# Patient Record
Sex: MALE | Race: WHITE | NOT HISPANIC OR LATINO
[De-identification: names, ages, dates, MRNs, and addresses within clinical notes are randomized per-mention and may not be internally consistent; named-entity substitution may affect disease eponyms.]

---

## 2017-11-16 PROBLEM — Z00.00 ENCOUNTER FOR PREVENTIVE HEALTH EXAMINATION: Status: ACTIVE | Noted: 2017-11-16

## 2017-11-17 ENCOUNTER — APPOINTMENT (OUTPATIENT)
Dept: DERMATOLOGY | Facility: CLINIC | Age: 62
End: 2017-11-17
Payer: COMMERCIAL

## 2017-11-17 VITALS — WEIGHT: 170 LBS | BODY MASS INDEX: 25.76 KG/M2 | HEIGHT: 68 IN

## 2017-11-17 VITALS — HEIGHT: 68 IN | BODY MASS INDEX: 25.76 KG/M2 | WEIGHT: 170 LBS

## 2017-11-17 PROCEDURE — 99213 OFFICE O/P EST LOW 20 MIN: CPT | Mod: 25

## 2017-11-17 PROCEDURE — 17003 DESTRUCT PREMALG LES 2-14: CPT

## 2017-11-17 PROCEDURE — 17000 DESTRUCT PREMALG LESION: CPT

## 2017-11-17 RX ORDER — CLOBETASOL PROPIONATE 0.5 MG/G
0.05 CREAM TOPICAL
Qty: 30 | Refills: 0 | Status: DISCONTINUED | COMMUNITY
Start: 2017-08-01 | End: 2017-11-17

## 2017-11-17 RX ORDER — AZITHROMYCIN 250 MG/1
250 TABLET, FILM COATED ORAL
Qty: 6 | Refills: 0 | Status: DISCONTINUED | COMMUNITY
Start: 2017-08-24 | End: 2017-11-17

## 2018-11-16 ENCOUNTER — APPOINTMENT (OUTPATIENT)
Dept: DERMATOLOGY | Facility: CLINIC | Age: 63
End: 2018-11-16
Payer: COMMERCIAL

## 2018-11-16 DIAGNOSIS — D48.5 NEOPLASM OF UNCERTAIN BEHAVIOR OF SKIN: ICD-10-CM

## 2018-11-16 PROCEDURE — 11100 BX SKIN SUBCUTANEOUS&/MUCOUS MEMBRANE 1 LESION: CPT | Mod: 59

## 2018-11-16 PROCEDURE — 17000 DESTRUCT PREMALG LESION: CPT

## 2018-11-16 PROCEDURE — 99213 OFFICE O/P EST LOW 20 MIN: CPT | Mod: 25

## 2018-11-16 RX ORDER — NEOMYCIN AND POLYMYXIN B SULFATES AND HYDROCORTISONE OTIC 10; 3.5; 1 MG/ML; MG/ML; [USP'U]/ML
3.5-10000-1 SUSPENSION AURICULAR (OTIC)
Qty: 10 | Refills: 0 | Status: DISCONTINUED | COMMUNITY
Start: 2017-08-24 | End: 2018-11-16

## 2018-11-16 RX ORDER — ASPIRIN ENTERIC COATED TABLETS 81 MG 81 MG/1
81 TABLET, DELAYED RELEASE ORAL
Qty: 90 | Refills: 0 | Status: DISCONTINUED | COMMUNITY
Start: 2017-07-10 | End: 2018-11-16

## 2018-12-03 LAB — CORE LAB BIOPSY: NORMAL

## 2019-01-07 ENCOUNTER — APPOINTMENT (OUTPATIENT)
Dept: DERMATOLOGY | Facility: CLINIC | Age: 64
End: 2019-01-07
Payer: COMMERCIAL

## 2019-01-07 DIAGNOSIS — C44.91 BASAL CELL CARCINOMA OF SKIN, UNSPECIFIED: ICD-10-CM

## 2019-01-07 PROCEDURE — 99214 OFFICE O/P EST MOD 30 MIN: CPT

## 2019-01-08 PROBLEM — C44.91 BASAL CELL CARCINOMA: Status: ACTIVE | Noted: 2019-01-07

## 2019-01-08 NOTE — HISTORY OF PRESENT ILLNESS
[FreeTextEntry1] : Mohs Consult for BCC on R apical triangle of the cutaneous upper lip [de-identified] : Date of consult: 01/07/2019 \par Referring physician: MARIAJOSE KENT MD \par Tumor: n+i BCC\par Location: R apical triangle\par \par KARAN VERA is a 63 year year-old male here today for Mohs consultation. Mr. VERA was recently diagnosed with the above tumor(s) by his referring dermatologist. Reports the lesion was previously growing slowly, not painful, bled, partially healed, recurred and bled again. Mr. VERA has not had Mohs surgery previously.\par \par SH: , works in IT. Pleasant patient. No upcoming travel plans.\par \par HBV/HCV/HIV history: Denied\par Current Anticoagulant Use: ASA (stent)\par History of medical implant or pre-surgical antibiotic use: Denied

## 2019-01-08 NOTE — REVIEW OF SYSTEMS
[Patient Intake Form Reviewed] : Patient intake form was reviewed [Negative] : Genitourinary [de-identified] : see hpi

## 2019-01-08 NOTE — PHYSICAL EXAM
[Alert] : alert [Oriented x 3] : ~L oriented x 3 [Well Nourished] : well nourished [Conjunctiva Non-injected] : conjunctiva non-injected [No Visual Lymphadenopathy] : no visual  lymphadenopathy [No Clubbing] : no clubbing [No Edema] : no edema [No Bromhidrosis] : no bromhidrosis [No Chromhidrosis] : no chromhidrosis [FreeTextEntry3] : 0.8x0.9cm atrophic biopsy site of R apical triangle

## 2019-02-04 ENCOUNTER — APPOINTMENT (OUTPATIENT)
Dept: DERMATOLOGY | Facility: CLINIC | Age: 64
End: 2019-02-04
Payer: COMMERCIAL

## 2019-02-04 PROCEDURE — 17311 MOHS 1 STAGE H/N/HF/G: CPT

## 2019-02-04 PROCEDURE — 14061 TIS TRNFR E/N/E/L10.1-30SQCM: CPT

## 2019-02-11 ENCOUNTER — APPOINTMENT (OUTPATIENT)
Dept: DERMATOLOGY | Facility: CLINIC | Age: 64
End: 2019-02-11
Payer: COMMERCIAL

## 2019-02-11 PROCEDURE — 99024 POSTOP FOLLOW-UP VISIT: CPT

## 2019-04-15 ENCOUNTER — APPOINTMENT (OUTPATIENT)
Dept: DERMATOLOGY | Facility: CLINIC | Age: 64
End: 2019-04-15
Payer: COMMERCIAL

## 2019-04-15 PROCEDURE — 99024 POSTOP FOLLOW-UP VISIT: CPT

## 2019-05-24 ENCOUNTER — APPOINTMENT (OUTPATIENT)
Dept: DERMATOLOGY | Facility: CLINIC | Age: 64
End: 2019-05-24
Payer: COMMERCIAL

## 2019-05-24 PROCEDURE — 99213 OFFICE O/P EST LOW 20 MIN: CPT

## 2019-05-24 NOTE — HISTORY OF PRESENT ILLNESS
[de-identified] : Pt. presents for skin check;\par No itching, bleeding, growing, changing lesions noted; \par Recent infiltrative BCC near R nose; Mohs; 1/2019

## 2019-05-24 NOTE — ASSESSMENT
[FreeTextEntry1] : Complete skin examination is negative for malignancy; \par Recent BCC;  Follow up for TBSE in 6 months \par Continue regular exams; \par AKs on face clear;

## 2019-05-24 NOTE — PHYSICAL EXAM
[Full Body Skin Exam Performed] : performed [FreeTextEntry3] : Skin examination performed of the face, neck, trunk, arms, legs; \par The patient is well, alert and oriented, pleasant and cooperative.\par Eyelids, conjunctivae, oral mucosa, digits and nails all normal.  \par No cervical adenopathy.\par \par Normal findings include:\par \par Seborrheic keratoses- back, flanks\par Angiomas\par Lentigines\par healed scar;  R paranasal near ala\par \par No lesions were suspicious for malignancy. \par \par

## 2019-06-11 ENCOUNTER — APPOINTMENT (OUTPATIENT)
Dept: DERMATOLOGY | Facility: CLINIC | Age: 64
End: 2019-06-11
Payer: COMMERCIAL

## 2019-06-11 PROCEDURE — 99213 OFFICE O/P EST LOW 20 MIN: CPT

## 2019-11-27 ENCOUNTER — APPOINTMENT (OUTPATIENT)
Dept: DERMATOLOGY | Facility: CLINIC | Age: 64
End: 2019-11-27
Payer: COMMERCIAL

## 2019-11-27 PROCEDURE — 99213 OFFICE O/P EST LOW 20 MIN: CPT

## 2019-11-27 NOTE — HISTORY OF PRESENT ILLNESS
[de-identified] : Pt. presents for skin check;\par No itching, bleeding, growing, changing lesions noted; \par Recent infiltrative BCC near R nose; Mohs; 1/2019

## 2019-11-27 NOTE — ASSESSMENT
[FreeTextEntry1] : Complete skin examination is negative for malignancy; \par Recent BCC;  Follow up for TBSE in 6 months \par Continue regular exams; \par AKs on face clear;\par Discussed sun protection

## 2020-05-29 ENCOUNTER — APPOINTMENT (OUTPATIENT)
Dept: DERMATOLOGY | Facility: CLINIC | Age: 65
End: 2020-05-29
Payer: COMMERCIAL

## 2020-05-29 VITALS — BODY MASS INDEX: 26.52 KG/M2 | WEIGHT: 175 LBS | HEIGHT: 68 IN

## 2020-05-29 PROCEDURE — 17000 DESTRUCT PREMALG LESION: CPT

## 2020-05-29 PROCEDURE — 99213 OFFICE O/P EST LOW 20 MIN: CPT | Mod: 25

## 2020-05-29 RX ORDER — METOPROLOL TARTRATE 75 MG/1
TABLET, FILM COATED ORAL
Refills: 0 | Status: DISCONTINUED | COMMUNITY
End: 2020-05-29

## 2020-05-29 RX ORDER — LEVOTHYROXINE SODIUM 0.05 MG/1
50 TABLET ORAL
Qty: 90 | Refills: 0 | Status: DISCONTINUED | COMMUNITY
Start: 2017-07-10 | End: 2020-05-29

## 2020-05-29 RX ORDER — PRASUGREL HYDROCHLORIDE 10 MG/1
10 TABLET, COATED ORAL
Qty: 90 | Refills: 0 | Status: DISCONTINUED | COMMUNITY
Start: 2017-01-11 | End: 2020-05-29

## 2020-05-29 NOTE — HISTORY OF PRESENT ILLNESS
[de-identified] : Pt. presents for skin check;\par No itching, bleeding, growing, changing lesions noted; \par Recent infiltrative BCC near R nose; Mohs; 1/2019\par c/o recent outbreaks on scalp\par

## 2020-05-29 NOTE — PHYSICAL EXAM
[Full Body Skin Exam Performed] : performed [FreeTextEntry3] : Skin examination performed of the face, neck, trunk, arms, legs; \par The patient is well, alert and oriented, pleasant and cooperative.\par Eyelids, conjunctivae, oral mucosa, digits and nails all normal.  \par No cervical adenopathy.\par \par Normal findings include:\par \par Seborrheic keratoses- back, flanks\par Angiomas\par Lentigines\par healed scar;  R paranasal near ala\par scaling erythematous papule; R lateral brow\par follicular papules;  posterior scalp\par \par No lesions were suspicious for malignancy. \par \par

## 2020-05-29 NOTE — ASSESSMENT
[FreeTextEntry1] : Complete skin examination is negative for malignancy; \par Recent BCC;  Follow up for TBSE in 6 months \par Continue regular exams; \par AK- LN2 R brow\par Discussed sun protection\par \par Scalp folliculitis;  appears to be resolving;  discussed DCN if flares again or becomes symptomatic;  call for Rx

## 2020-08-14 ENCOUNTER — APPOINTMENT (OUTPATIENT)
Dept: DERMATOLOGY | Facility: CLINIC | Age: 65
End: 2020-08-14
Payer: MEDICARE

## 2020-08-14 VITALS — BODY MASS INDEX: 25.76 KG/M2 | HEIGHT: 68 IN | WEIGHT: 170 LBS

## 2020-08-14 PROCEDURE — 99213 OFFICE O/P EST LOW 20 MIN: CPT

## 2020-08-14 NOTE — ASSESSMENT
[FreeTextEntry1] : doing very well; \par finish last 2 weeks doxy at qd, then D/C;\par restart if recurs; \par has skin check 11/20

## 2020-08-14 NOTE — HISTORY OF PRESENT ILLNESS
[de-identified] : f/u for check of scalp;  has been on doxy, now at qd;  since late June\par very much improved

## 2020-08-14 NOTE — PHYSICAL EXAM
[FreeTextEntry3] : Skin examination performed of the face, neck, chest, hands, lower legs;\par The patient is well, alert and oriented, pleasant and cooperative.\par Eyelids, conjunctivae, oral mucosa, digits and nails all normal.  \par No cervical adenopathy.\par \par \par scalp:  no residual pustules/papules

## 2020-11-20 ENCOUNTER — APPOINTMENT (OUTPATIENT)
Dept: DERMATOLOGY | Facility: CLINIC | Age: 65
End: 2020-11-20
Payer: MEDICARE

## 2020-11-20 PROCEDURE — 99213 OFFICE O/P EST LOW 20 MIN: CPT

## 2020-11-20 NOTE — HISTORY OF PRESENT ILLNESS
[de-identified] : Pt. presents for skin check;\par c/o few spots of concern;\par Severity:  mild  \par Modifying factors:  none\par Associated symptoms:  none\par Context:  no association with activity\par Also: folliculitis of scalp;  off doxy since 9/2020;  improved;  \par Recent BCC near nose 2019\par

## 2020-11-20 NOTE — ASSESSMENT
[FreeTextEntry1] : Complete skin examination is negative for malignancy; \par Recent BCC;  Follow up for TBSE in 6 months \par Continue regular exams; \par \par Scalp folliculitis;  responded well to doxy;  restart prn

## 2020-11-20 NOTE — PHYSICAL EXAM
[Full Body Skin Exam Performed] : performed [FreeTextEntry3] : Skin examination performed of the face, neck, trunk, arms, legs; \par The patient is well, alert and oriented, pleasant and cooperative.\par Eyelids, conjunctivae, oral mucosa, digits and nails all normal.  \par No cervical adenopathy.\par \par Normal findings include:\par \par Seborrheic keratoses- back, flanks- L neck\par Angiomas\par Lentigines\par healed scar;  R paranasal near ala\par Scalp clear;\par no residual AKs on face\par \par No lesions were suspicious for malignancy. \par \par

## 2020-12-01 DIAGNOSIS — Z82.49 FAMILY HISTORY OF ISCHEMIC HEART DISEASE AND OTHER DISEASES OF THE CIRCULATORY SYSTEM: ICD-10-CM

## 2021-01-20 ENCOUNTER — APPOINTMENT (OUTPATIENT)
Dept: INTERNAL MEDICINE | Facility: CLINIC | Age: 66
End: 2021-01-20
Payer: MEDICARE

## 2021-01-20 VITALS
BODY MASS INDEX: 26.07 KG/M2 | SYSTOLIC BLOOD PRESSURE: 140 MMHG | DIASTOLIC BLOOD PRESSURE: 86 MMHG | WEIGHT: 174 LBS | HEIGHT: 68.5 IN

## 2021-01-20 PROCEDURE — G0402 INITIAL PREVENTIVE EXAM: CPT

## 2021-01-20 PROCEDURE — G0438: CPT

## 2021-01-20 PROCEDURE — G0009: CPT

## 2021-01-20 PROCEDURE — 36415 COLL VENOUS BLD VENIPUNCTURE: CPT

## 2021-01-20 PROCEDURE — 90732 PPSV23 VACC 2 YRS+ SUBQ/IM: CPT

## 2021-01-20 NOTE — HISTORY OF PRESENT ILLNESS
[de-identified] : 66 y/o male presents for annual Medicare wellness visit and follow-up of chronic problems and fasting labs.\par He has been generally well without any recent illness.\par He has follow-up with his cardiologist scheduled within the next month.

## 2021-01-20 NOTE — PHYSICAL EXAM
[Clear to Auscultation] : lungs were clear to auscultation bilaterally [No Edema] : there was no peripheral edema [Soft] : abdomen soft [Non Tender] : non-tender [No Rash] : no rash [No Focal Deficits] : no focal deficits [Normal] : affect was normal and insight and judgment were intact

## 2021-01-20 NOTE — PLAN
[FreeTextEntry1] : His exam was unremarkable.  Follow-up fasting labs was sent including thyroid function.\par He will continue with present medications unless the results require a change. \par  He had a PSA done in July that was normal and this will be repeated in 6 months.\par He has follow-up scheduled with his cardiologist.\par \par Covid vaccination discussed.\par \par Received Pneumovax today.

## 2021-01-20 NOTE — REVIEW OF SYSTEMS
[Fever] : no fever [Chills] : no chills [Vision Problems] : no vision problems [Hearing Loss] : no hearing loss [Chest Pain] : no chest pain [Palpitations] : no palpitations [Lower Ext Edema] : no lower extremity edema [Orthopena] : no orthopnea [Shortness Of Breath] : no shortness of breath [Abdominal Pain] : no abdominal pain [Muscle Weakness] : no muscle weakness [Joint Swelling] : no joint swelling [Skin Rash] : no skin rash [Headache] : no headache [Memory Loss] : no memory loss

## 2021-01-21 LAB
ALBUMIN SERPL ELPH-MCNC: 4.8 G/DL
ALP BLD-CCNC: 75 U/L
ALT SERPL-CCNC: 26 U/L
ANION GAP SERPL CALC-SCNC: 13 MMOL/L
AST SERPL-CCNC: 26 U/L
BASOPHILS # BLD AUTO: 0.05 K/UL
BASOPHILS NFR BLD AUTO: 0.9 %
BILIRUB SERPL-MCNC: 0.6 MG/DL
BUN SERPL-MCNC: 11 MG/DL
CALCIUM SERPL-MCNC: 9.7 MG/DL
CHLORIDE SERPL-SCNC: 100 MMOL/L
CHOLEST SERPL-MCNC: 164 MG/DL
CO2 SERPL-SCNC: 24 MMOL/L
CREAT SERPL-MCNC: 1.12 MG/DL
EOSINOPHIL # BLD AUTO: 0.09 K/UL
EOSINOPHIL NFR BLD AUTO: 1.7 %
FT4I SERPL CALC-MCNC: 7.3 INDEX
GLUCOSE SERPL-MCNC: 92 MG/DL
HCT VFR BLD CALC: 44.6 %
HDLC SERPL-MCNC: 49 MG/DL
HGB BLD-MCNC: 14.1 G/DL
IMM GRANULOCYTES NFR BLD AUTO: 0.4 %
LDLC SERPL CALC-MCNC: 93 MG/DL
LYMPHOCYTES # BLD AUTO: 1.31 K/UL
LYMPHOCYTES NFR BLD AUTO: 24.8 %
MAN DIFF?: NORMAL
MCHC RBC-ENTMCNC: 28 PG
MCHC RBC-ENTMCNC: 31.6 GM/DL
MCV RBC AUTO: 88.5 FL
MONOCYTES # BLD AUTO: 0.35 K/UL
MONOCYTES NFR BLD AUTO: 6.6 %
NEUTROPHILS # BLD AUTO: 3.47 K/UL
NEUTROPHILS NFR BLD AUTO: 65.6 %
NONHDLC SERPL-MCNC: 115 MG/DL
PLATELET # BLD AUTO: 260 K/UL
POTASSIUM SERPL-SCNC: 4.4 MMOL/L
PROT SERPL-MCNC: 7.2 G/DL
RBC # BLD: 5.04 M/UL
RBC # FLD: 12.2 %
SODIUM SERPL-SCNC: 137 MMOL/L
T4 SERPL-MCNC: 7.7 UG/DL
TRIGL SERPL-MCNC: 112 MG/DL
TSH SERPL-ACNC: 2.25 UIU/ML
WBC # FLD AUTO: 5.29 K/UL

## 2021-01-22 RX ORDER — METOPROLOL TARTRATE 75 MG/1
TABLET, FILM COATED ORAL
Refills: 0 | Status: DISCONTINUED | COMMUNITY
End: 2021-01-22

## 2021-03-04 ENCOUNTER — RX RENEWAL (OUTPATIENT)
Age: 66
End: 2021-03-04

## 2021-03-09 ENCOUNTER — APPOINTMENT (OUTPATIENT)
Dept: INTERNAL MEDICINE | Facility: CLINIC | Age: 66
End: 2021-03-09
Payer: MEDICARE

## 2021-03-09 VITALS
BODY MASS INDEX: 26.07 KG/M2 | DIASTOLIC BLOOD PRESSURE: 80 MMHG | WEIGHT: 174 LBS | HEIGHT: 68.5 IN | SYSTOLIC BLOOD PRESSURE: 130 MMHG

## 2021-03-09 PROCEDURE — 99213 OFFICE O/P EST LOW 20 MIN: CPT

## 2021-03-09 NOTE — PHYSICAL EXAM
[Normal] : no acute distress, well nourished, well developed and well-appearing [No JVD] : no jugular venous distention [No Lymphadenopathy] : no lymphadenopathy [Supple] : supple [Thyroid Normal, No Nodules] : the thyroid was normal and there were no nodules present [de-identified] : Left lower neck/supraclavicular area with small firm nontender mobile SQ nodule

## 2021-03-09 NOTE — PLAN
[FreeTextEntry1] : Supra clavicular nodule–he was told the exam is most consistent with a small subcutaneous cyst.  The possibility of it being a lymph node cannot be excluded and he will first obtain a sonogram of the area and depending on the results further testing or observation will be decided upon.

## 2021-03-09 NOTE — HISTORY OF PRESENT ILLNESS
[de-identified] : 66 y/o male presents complaining of a small lump he noticed in his left lower neck area recently.  First noticed it about 3 weeks ago when it was somewhat tender.  There is no pain or tenderness at present but the lump is still there.  It has not changed in character over the 3 weeks.

## 2021-03-09 NOTE — REVIEW OF SYSTEMS
[Chills] : no chills [Earache] : no earache [Postnasal Drip] : no postnasal drip [Hoarseness] : no hoarseness [Chest Pain] : no chest pain [Shortness Of Breath] : no shortness of breath [Swollen Glands] : no swollen glands

## 2021-03-11 ENCOUNTER — TRANSCRIPTION ENCOUNTER (OUTPATIENT)
Age: 66
End: 2021-03-11

## 2021-03-11 ENCOUNTER — APPOINTMENT (OUTPATIENT)
Dept: ULTRASOUND IMAGING | Facility: CLINIC | Age: 66
End: 2021-03-11
Payer: MEDICARE

## 2021-03-11 ENCOUNTER — OUTPATIENT (OUTPATIENT)
Dept: OUTPATIENT SERVICES | Facility: HOSPITAL | Age: 66
LOS: 1 days | End: 2021-03-11
Payer: MEDICARE

## 2021-03-11 DIAGNOSIS — R22.1 LOCALIZED SWELLING, MASS AND LUMP, NECK: ICD-10-CM

## 2021-03-11 PROCEDURE — 76536 US EXAM OF HEAD AND NECK: CPT

## 2021-03-11 PROCEDURE — 76536 US EXAM OF HEAD AND NECK: CPT | Mod: 26

## 2021-04-28 ENCOUNTER — APPOINTMENT (OUTPATIENT)
Dept: INTERNAL MEDICINE | Facility: CLINIC | Age: 66
End: 2021-04-28
Payer: MEDICARE

## 2021-04-28 VITALS
BODY MASS INDEX: 26.07 KG/M2 | HEIGHT: 68.5 IN | SYSTOLIC BLOOD PRESSURE: 130 MMHG | DIASTOLIC BLOOD PRESSURE: 80 MMHG | WEIGHT: 174 LBS

## 2021-04-28 DIAGNOSIS — R59.1 GENERALIZED ENLARGED LYMPH NODES: ICD-10-CM

## 2021-04-28 DIAGNOSIS — R22.1 LOCALIZED SWELLING, MASS AND LUMP, NECK: ICD-10-CM

## 2021-04-28 PROCEDURE — 99213 OFFICE O/P EST LOW 20 MIN: CPT | Mod: 25

## 2021-04-28 PROCEDURE — 36415 COLL VENOUS BLD VENIPUNCTURE: CPT

## 2021-04-28 NOTE — REVIEW OF SYSTEMS
[Fever] : no fever [Chills] : no chills [Chest Pain] : no chest pain [Swollen Glands] : no swollen glands [de-identified] : Left neck swelling has resolved

## 2021-04-28 NOTE — PLAN
[FreeTextEntry1] : Hyperlipidemia–follow-up fasting labs are sent.  He will remain on Crestor 40 mg for now and this can be adjusted based on the results when they are available.  He will call in 2 days and they will be faxed to his cardiologist as well.\par \par Neck lymphadenopathy–this has since resolved and he was told he was most likely secondary to having received Covid vaccine in his left arm prior.  The sonogram was consistent with an inflamed lymph node.  If there is any recurrence he will follow-up.

## 2021-04-28 NOTE — HISTORY OF PRESENT ILLNESS
[de-identified] : 66 y/o male presents for follow up and fasting labs.  At his last visit on 3 9 he had presented with a left-sided lower neck nodule.  He had a sonogram done that revealed an inflamed lymph node.  It has since resolved and was felt to be related to him having just received Covid vaccine prior to that.  His cholesterol medicine was also changed from Pravachol to Crestor 40 by his cardiologist and he needs follow-up fasting labs done to assess the level.

## 2021-04-29 LAB
ALBUMIN SERPL ELPH-MCNC: 4.5 G/DL
ALP BLD-CCNC: 63 U/L
ALT SERPL-CCNC: 47 U/L
ANION GAP SERPL CALC-SCNC: 11 MMOL/L
AST SERPL-CCNC: 40 U/L
BILIRUB SERPL-MCNC: 0.4 MG/DL
BUN SERPL-MCNC: 9 MG/DL
CALCIUM SERPL-MCNC: 9.6 MG/DL
CHLORIDE SERPL-SCNC: 105 MMOL/L
CHOLEST SERPL-MCNC: 111 MG/DL
CK SERPL-CCNC: 228 U/L
CO2 SERPL-SCNC: 22 MMOL/L
CREAT SERPL-MCNC: 1.01 MG/DL
GLUCOSE SERPL-MCNC: 100 MG/DL
HDLC SERPL-MCNC: 54 MG/DL
LDLC SERPL CALC-MCNC: 41 MG/DL
NONHDLC SERPL-MCNC: 58 MG/DL
POTASSIUM SERPL-SCNC: 4.1 MMOL/L
PROT SERPL-MCNC: 6.9 G/DL
SODIUM SERPL-SCNC: 138 MMOL/L
TRIGL SERPL-MCNC: 84 MG/DL

## 2021-05-07 ENCOUNTER — RX RENEWAL (OUTPATIENT)
Age: 66
End: 2021-05-07

## 2021-05-21 ENCOUNTER — APPOINTMENT (OUTPATIENT)
Dept: DERMATOLOGY | Facility: CLINIC | Age: 66
End: 2021-05-21
Payer: MEDICARE

## 2021-05-21 VITALS — BODY MASS INDEX: 26.52 KG/M2 | HEIGHT: 68 IN | WEIGHT: 175 LBS

## 2021-05-21 DIAGNOSIS — L73.9 FOLLICULAR DISORDER, UNSPECIFIED: ICD-10-CM

## 2021-05-21 PROCEDURE — 17000 DESTRUCT PREMALG LESION: CPT

## 2021-05-21 PROCEDURE — 99214 OFFICE O/P EST MOD 30 MIN: CPT | Mod: 25

## 2021-05-21 PROCEDURE — 17003 DESTRUCT PREMALG LES 2-14: CPT

## 2021-05-21 NOTE — HISTORY OF PRESENT ILLNESS
[de-identified] : Pt. presents for skin check;\par c/o few spots of concern;\par Severity:  mild  \par Modifying factors:  none\par Associated symptoms:  none\par Context:  no association with activity\par Also: folliculitis of scalp; back on doxy; flared up recently; \par Recent BCC near nose 2019\par

## 2021-05-21 NOTE — ASSESSMENT
[FreeTextEntry1] : Complete skin examination is negative for malignancy; Multiple new concerns were addressed and discussed.\par Therapeutic options and their risks and benefits; along with multiple diagnostic possibilities were discussed at length;\par risks and benefits of skin biopsy and/or other further study were discussed;\par \par Recent BCC;  Follow up for TBSE in 6 months \par Continue regular exams; \par LN2 to AKs\par \par Scalp folliculitis;  restarted doxy, but appears to be SK and AKs of scalp\par reduce to 100/d for 2 weeks, then d/c;  education; \par

## 2021-05-21 NOTE — PHYSICAL EXAM
[Full Body Skin Exam Performed] : performed [FreeTextEntry3] : Skin examination performed of the face, neck, trunk, arms, legs; \par The patient is well, alert and oriented, pleasant and cooperative.\par Eyelids, conjunctivae, oral mucosa, digits and nails all normal.  \par No cervical adenopathy.\par \par Normal findings include:\par \par Seborrheic keratoses- back, flanks- L neck\par Angiomas\par Lentigines\par healed scar;  R paranasal near ala\par Scalp - + waxy plaque L parietal, with 2 scaly papules superiorly; \par scaling erythematous papules; R mid cheek, R sideburn; \par \par No lesions were suspicious for malignancy. \par \par

## 2021-07-20 ENCOUNTER — RX RENEWAL (OUTPATIENT)
Age: 66
End: 2021-07-20

## 2021-07-28 ENCOUNTER — RX RENEWAL (OUTPATIENT)
Age: 66
End: 2021-07-28

## 2021-08-11 ENCOUNTER — RX RENEWAL (OUTPATIENT)
Age: 66
End: 2021-08-11

## 2021-10-10 ENCOUNTER — RX RENEWAL (OUTPATIENT)
Age: 66
End: 2021-10-10

## 2021-10-14 ENCOUNTER — APPOINTMENT (OUTPATIENT)
Dept: INTERNAL MEDICINE | Facility: CLINIC | Age: 66
End: 2021-10-14
Payer: MEDICARE

## 2021-10-14 ENCOUNTER — NON-APPOINTMENT (OUTPATIENT)
Age: 66
End: 2021-10-14

## 2021-10-14 PROCEDURE — 90662 IIV NO PRSV INCREASED AG IM: CPT

## 2021-10-14 PROCEDURE — G0008: CPT

## 2021-10-14 PROCEDURE — 0003A: CPT

## 2021-10-18 ENCOUNTER — APPOINTMENT (OUTPATIENT)
Dept: INTERNAL MEDICINE | Facility: CLINIC | Age: 66
End: 2021-10-18
Payer: MEDICARE

## 2021-10-18 VITALS
HEIGHT: 68 IN | WEIGHT: 178 LBS | BODY MASS INDEX: 26.98 KG/M2 | DIASTOLIC BLOOD PRESSURE: 90 MMHG | SYSTOLIC BLOOD PRESSURE: 138 MMHG

## 2021-10-18 DIAGNOSIS — M17.11 UNILATERAL PRIMARY OSTEOARTHRITIS, RIGHT KNEE: ICD-10-CM

## 2021-10-18 PROCEDURE — 99214 OFFICE O/P EST MOD 30 MIN: CPT | Mod: 25

## 2021-10-18 PROCEDURE — 36415 COLL VENOUS BLD VENIPUNCTURE: CPT

## 2021-10-18 RX ORDER — PRAVASTATIN SODIUM 40 MG/1
40 TABLET ORAL DAILY
Qty: 90 | Refills: 1 | Status: DISCONTINUED | COMMUNITY
Start: 2017-07-10 | End: 2021-10-18

## 2021-10-18 RX ORDER — METOPROLOL SUCCINATE 25 MG/1
25 TABLET, EXTENDED RELEASE ORAL
Qty: 90 | Refills: 0 | Status: DISCONTINUED | COMMUNITY
Start: 2021-01-20 | End: 2021-10-18

## 2021-10-18 RX ORDER — LEVOTHYROXINE SODIUM 0.07 MG/1
75 TABLET ORAL DAILY
Qty: 90 | Refills: 0 | Status: DISCONTINUED | COMMUNITY
Start: 2021-07-28 | End: 2021-10-18

## 2021-10-18 NOTE — REVIEW OF SYSTEMS
[Joint Pain] : joint pain [Joint Stiffness] : joint stiffness [Negative] : Psychiatric [Fever] : no fever [Chills] : no chills [Chest Pain] : no chest pain [Lower Ext Edema] : no lower extremity edema [Shortness Of Breath] : no shortness of breath [Dyspnea on Exertion] : not dyspnea on exertion [Abdominal Pain] : no abdominal pain [Dysuria] : no dysuria [Muscle Weakness] : no muscle weakness [Joint Swelling] : no joint swelling [Headache] : no headache [Dizziness] : no dizziness

## 2021-10-18 NOTE — HEALTH RISK ASSESSMENT
[No] : No [0] : 2) Feeling down, depressed, or hopeless: Not at all (0) [PHQ-2 Negative - No further assessment needed] : PHQ-2 Negative - No further assessment needed [] : No [[id number]] : 0

## 2021-10-18 NOTE — PHYSICAL EXAM
[No Acute Distress] : no acute distress [Normal TMs] : both tympanic membranes were normal [No JVD] : no jugular venous distention [Clear to Auscultation] : lungs were clear to auscultation bilaterally [Normal Rate] : normal rate  [Regular Rhythm] : with a regular rhythm [No Edema] : there was no peripheral edema

## 2021-10-18 NOTE — ASSESSMENT
[FreeTextEntry1] : CAD/hypertension–he is doing well he follows up with his cardiologist on a regular basis.  He remains on metoprolol ER 25 mg daily.\par \par Hyperlipidemia–follow-up lipid profile sent his most recent cholesterol was 111 with an LDL of 41.  For now he will remain on Crestor 40 mg and Zetia 10 mg.\par \par Hypothyroidism–follow-up thyroid functions were sent.  His most recent TSH in 1/21 was 2.25.  He will remain on levothyroxine 75 mcg daily.\par \par Allergic rhinitis–he continues with Astelin spray as well as Allegra daily as needed.\par \par Follow-up PSA was sent.  His previous in 7/20 was 0.89.\par \par Knee arthritis–various options and treatments were discussed.  He is following up with his orthopedist soon.  He had been doing gel injections with mixed results.\par \par Total of 30 minutes was spent on this encounter.

## 2021-10-18 NOTE — HISTORY OF PRESENT ILLNESS
[de-identified] : 65 y/o presents for follow up and fasting labs.\par He has a PMH of CAD/PTCA in 6/16, hypertension, hyperlipidemia, hypothyroidism as well as chronic allergy symptoms.\par He has been generally well without any recent illness.  Arthritis and is currently seeing an orthopedist.

## 2021-10-19 LAB
ALBUMIN SERPL ELPH-MCNC: 4.8 G/DL
ALP BLD-CCNC: 75 U/L
ALT SERPL-CCNC: 50 U/L
ANION GAP SERPL CALC-SCNC: 11 MMOL/L
AST SERPL-CCNC: 39 U/L
BASOPHILS # BLD AUTO: 0.04 K/UL
BASOPHILS NFR BLD AUTO: 0.9 %
BILIRUB SERPL-MCNC: 0.6 MG/DL
BUN SERPL-MCNC: 11 MG/DL
CALCIUM SERPL-MCNC: 9.8 MG/DL
CHLORIDE SERPL-SCNC: 102 MMOL/L
CHOLEST SERPL-MCNC: 116 MG/DL
CO2 SERPL-SCNC: 24 MMOL/L
CREAT SERPL-MCNC: 0.98 MG/DL
EOSINOPHIL # BLD AUTO: 0.18 K/UL
EOSINOPHIL NFR BLD AUTO: 4 %
GLUCOSE SERPL-MCNC: 91 MG/DL
HCT VFR BLD CALC: 43 %
HDLC SERPL-MCNC: 47 MG/DL
HGB BLD-MCNC: 13.9 G/DL
IMM GRANULOCYTES NFR BLD AUTO: 0.2 %
LDLC SERPL CALC-MCNC: 52 MG/DL
LYMPHOCYTES # BLD AUTO: 1.04 K/UL
LYMPHOCYTES NFR BLD AUTO: 23.1 %
MAN DIFF?: NORMAL
MCHC RBC-ENTMCNC: 28.2 PG
MCHC RBC-ENTMCNC: 32.3 GM/DL
MCV RBC AUTO: 87.2 FL
MONOCYTES # BLD AUTO: 0.39 K/UL
MONOCYTES NFR BLD AUTO: 8.7 %
NEUTROPHILS # BLD AUTO: 2.84 K/UL
NEUTROPHILS NFR BLD AUTO: 63.1 %
NONHDLC SERPL-MCNC: 68 MG/DL
PLATELET # BLD AUTO: 223 K/UL
POTASSIUM SERPL-SCNC: 4.3 MMOL/L
PROT SERPL-MCNC: 7 G/DL
PSA SERPL-MCNC: 0.76 NG/ML
RBC # BLD: 4.93 M/UL
RBC # FLD: 12.5 %
SODIUM SERPL-SCNC: 137 MMOL/L
T3 SERPL-MCNC: 111 NG/DL
T4 SERPL-MCNC: 8 UG/DL
TRIGL SERPL-MCNC: 82 MG/DL
TSH SERPL-ACNC: 2.83 UIU/ML
WBC # FLD AUTO: 4.5 K/UL

## 2021-10-21 ENCOUNTER — APPOINTMENT (OUTPATIENT)
Dept: INTERNAL MEDICINE | Facility: CLINIC | Age: 66
End: 2021-10-21

## 2021-11-24 ENCOUNTER — APPOINTMENT (OUTPATIENT)
Dept: DERMATOLOGY | Facility: CLINIC | Age: 66
End: 2021-11-24
Payer: MEDICARE

## 2021-11-24 PROCEDURE — 99213 OFFICE O/P EST LOW 20 MIN: CPT

## 2021-11-24 NOTE — PHYSICAL EXAM
[Full Body Skin Exam Performed] : performed [FreeTextEntry3] : Skin examination performed of the face, neck, trunk, arms, legs; \par The patient is well, alert and oriented, pleasant and cooperative.\par Eyelids, conjunctivae, oral mucosa, digits and nails all normal.  \par No cervical adenopathy.\par \par Normal findings include:\par \par Seborrheic keratoses- back, flanks- L neck\par Angiomas\par Lentigines\par healed scar;  R paranasal near ala\par AKs resolved on face s/p cryo \par \par No lesions were suspicious for malignancy. \par \par

## 2021-11-24 NOTE — ASSESSMENT
[FreeTextEntry1] : Complete skin examination is negative for malignancy; Multiple new concerns were addressed and discussed.\par Therapeutic options and their risks and benefits; along with multiple diagnostic possibilities were discussed at length;\par risks and benefits of skin biopsy and/or other further study were discussed;\par \par Recent BCC;  Follow up for TBSE in 6 months \par Continue regular exams; \par

## 2021-11-24 NOTE — HISTORY OF PRESENT ILLNESS
[de-identified] : Pt. presents for skin check;\par c/o few spots of concern; back\par Severity:  mild  \par Modifying factors:  none\par Associated symptoms:  none\par Context:  no association with activity\par \par Recent BCC near nose 2019\par

## 2022-01-15 ENCOUNTER — RX RENEWAL (OUTPATIENT)
Age: 67
End: 2022-01-15

## 2022-02-10 ENCOUNTER — APPOINTMENT (OUTPATIENT)
Dept: INTERNAL MEDICINE | Facility: CLINIC | Age: 67
End: 2022-02-10
Payer: MEDICARE

## 2022-02-10 VITALS — DIASTOLIC BLOOD PRESSURE: 82 MMHG | SYSTOLIC BLOOD PRESSURE: 128 MMHG

## 2022-02-10 DIAGNOSIS — H00.019 HORDEOLUM EXTERNUM UNSPECIFIED EYE, UNSPECIFIED EYELID: ICD-10-CM

## 2022-02-10 PROCEDURE — 99213 OFFICE O/P EST LOW 20 MIN: CPT

## 2022-02-10 NOTE — ASSESSMENT
[FreeTextEntry1] : Kaleigh/stye–he will continue with warm compresses and is also given Maxitrol solution and will use 1 drop 4 times daily for the next 5 to 7 days.  He will follow-up with any worsening or there is no improvement.

## 2022-02-10 NOTE — HISTORY OF PRESENT ILLNESS
[FreeTextEntry8] : Presents complaining of right upper lid swelling over the past few days.  He has been using warm compresses without any significant improvement.  He denies any visual problems.

## 2022-02-10 NOTE — PHYSICAL EXAM
[No Acute Distress] : no acute distress [de-identified] : Right upper lid margin with slight swelling/erythema on the medial aspect.  The rest of the eye exam is unremarkable.

## 2022-02-10 NOTE — REVIEW OF SYSTEMS
[Fever] : no fever [Discharge] : no discharge [Pain] : no pain [Vision Problems] : no vision problems

## 2022-04-06 ENCOUNTER — RX RENEWAL (OUTPATIENT)
Age: 67
End: 2022-04-06

## 2022-04-15 ENCOUNTER — NON-APPOINTMENT (OUTPATIENT)
Age: 67
End: 2022-04-15

## 2022-04-15 ENCOUNTER — APPOINTMENT (OUTPATIENT)
Dept: INTERNAL MEDICINE | Facility: CLINIC | Age: 67
End: 2022-04-15
Payer: MEDICARE

## 2022-04-15 VITALS
SYSTOLIC BLOOD PRESSURE: 130 MMHG | WEIGHT: 180 LBS | DIASTOLIC BLOOD PRESSURE: 90 MMHG | HEIGHT: 68 IN | BODY MASS INDEX: 27.28 KG/M2

## 2022-04-15 DIAGNOSIS — E83.118 OTHER HEMOCHROMATOSIS: ICD-10-CM

## 2022-04-15 PROCEDURE — 36415 COLL VENOUS BLD VENIPUNCTURE: CPT

## 2022-04-15 PROCEDURE — G0439: CPT

## 2022-04-15 NOTE — REVIEW OF SYSTEMS
[Joint Pain] : joint pain [Fever] : no fever [Vision Problems] : no vision problems [Chest Pain] : no chest pain [Palpitations] : no palpitations [Lower Ext Edema] : no lower extremity edema [Shortness Of Breath] : no shortness of breath [Dyspnea on Exertion] : not dyspnea on exertion [Abdominal Pain] : no abdominal pain [Muscle Weakness] : no muscle weakness [Headache] : no headache [Dizziness] : no dizziness [FreeTextEntry9] : Right knee pain/arthritis

## 2022-04-15 NOTE — PHYSICAL EXAM
[No Acute Distress] : no acute distress [No JVD] : no jugular venous distention [No Respiratory Distress] : no respiratory distress  [Normal Rate] : normal rate  [Regular Rhythm] : with a regular rhythm [No Murmur] : no murmur heard [No Focal Deficits] : no focal deficits [Normal Affect] : the affect was normal [Alert and Oriented x3] : oriented to person, place, and time

## 2022-04-15 NOTE — ASSESSMENT
[FreeTextEntry1] : His exam is unremarkable.\par \par CAD/hypertension–he follows with his cardiologist regularly.  He has been stable.  He will continue with metoprolol ER 25 mg daily and aspirin 81 mg daily.\par \par Hyperlipidemia–follow-up lipid profile was sent.  Cholesterol is well controlled.  Continue Crestor 40 mg daily and Zetia 10 mg daily.\par \par Hypothyroidism–follow-up TSH was sent and he will continue with levothyroxine 75 mcg daily.\par \par Family history of hemochromatosis–ferritin level was sent.\par \par His last colonoscopy was in 2014 and that was an unremarkable exam.\par \par He had a PSA done in 10/21 that was 0.76.

## 2022-04-15 NOTE — HEALTH RISK ASSESSMENT
[Never] : Never [No] : In the past 12 months have you used drugs other than those required for medical reasons? No [0] : 2) Feeling down, depressed, or hopeless: Not at all (0) [PHQ-2 Negative - No further assessment needed] : PHQ-2 Negative - No further assessment needed [Patient reported colonoscopy was normal] : Patient reported colonoscopy was normal [[id number]] : 0 [ColonoscopyDate] : 2014

## 2022-04-15 NOTE — HISTORY OF PRESENT ILLNESS
[de-identified] : 67 y/o male presents for annual wellness exam and fasting labs.\par He has a history of CAD/PTCA in 2016, hypertension, hyperlipidemia, hypothyroidism and chronic allergies.\par There is also a family history of hemochromatosis and he is asking about having a ferritin level checked.  He states it has been checked many years ago was normal but would like to do it again.  He has been generally well without any recent illness.\par Last colonoscopy was in 2014.

## 2022-04-16 LAB
ALBUMIN SERPL ELPH-MCNC: 4.7 G/DL
ALP BLD-CCNC: 66 U/L
ALT SERPL-CCNC: 38 U/L
ANION GAP SERPL CALC-SCNC: 12 MMOL/L
AST SERPL-CCNC: 31 U/L
BASOPHILS # BLD AUTO: 0.04 K/UL
BASOPHILS NFR BLD AUTO: 0.8 %
BILIRUB SERPL-MCNC: 0.7 MG/DL
BUN SERPL-MCNC: 14 MG/DL
CALCIUM SERPL-MCNC: 9.6 MG/DL
CHLORIDE SERPL-SCNC: 103 MMOL/L
CHOLEST SERPL-MCNC: 115 MG/DL
CO2 SERPL-SCNC: 22 MMOL/L
CREAT SERPL-MCNC: 0.93 MG/DL
EGFR: 91 ML/MIN/1.73M2
EOSINOPHIL # BLD AUTO: 0.07 K/UL
EOSINOPHIL NFR BLD AUTO: 1.3 %
FERRITIN SERPL-MCNC: 74 NG/ML
GLUCOSE SERPL-MCNC: 100 MG/DL
HCT VFR BLD CALC: 43.9 %
HDLC SERPL-MCNC: 52 MG/DL
HGB BLD-MCNC: 13.7 G/DL
IMM GRANULOCYTES NFR BLD AUTO: 0.2 %
IRON SATN MFR SERPL: 15 %
IRON SERPL-MCNC: 52 UG/DL
LDLC SERPL CALC-MCNC: 48 MG/DL
LYMPHOCYTES # BLD AUTO: 1.14 K/UL
LYMPHOCYTES NFR BLD AUTO: 22 %
MAN DIFF?: NORMAL
MCHC RBC-ENTMCNC: 27.1 PG
MCHC RBC-ENTMCNC: 31.2 GM/DL
MCV RBC AUTO: 86.9 FL
MONOCYTES # BLD AUTO: 0.33 K/UL
MONOCYTES NFR BLD AUTO: 6.4 %
NEUTROPHILS # BLD AUTO: 3.6 K/UL
NEUTROPHILS NFR BLD AUTO: 69.3 %
NONHDLC SERPL-MCNC: 63 MG/DL
PLATELET # BLD AUTO: 238 K/UL
POTASSIUM SERPL-SCNC: 4.3 MMOL/L
PROT SERPL-MCNC: 7 G/DL
RBC # BLD: 5.05 M/UL
RBC # FLD: 13 %
SODIUM SERPL-SCNC: 137 MMOL/L
TIBC SERPL-MCNC: 356 UG/DL
TRIGL SERPL-MCNC: 77 MG/DL
TSH SERPL-ACNC: 1.72 UIU/ML
UIBC SERPL-MCNC: 304 UG/DL
WBC # FLD AUTO: 5.19 K/UL

## 2022-04-19 RX ORDER — ASPIRIN 81 MG
81 TABLET, DELAYED RELEASE (ENTERIC COATED) ORAL
Refills: 0 | Status: ACTIVE | COMMUNITY

## 2022-05-06 ENCOUNTER — APPOINTMENT (OUTPATIENT)
Dept: DERMATOLOGY | Facility: CLINIC | Age: 67
End: 2022-05-06
Payer: MEDICARE

## 2022-05-06 PROCEDURE — 17000 DESTRUCT PREMALG LESION: CPT

## 2022-05-06 PROCEDURE — 99213 OFFICE O/P EST LOW 20 MIN: CPT | Mod: 25

## 2022-05-06 NOTE — ASSESSMENT
[FreeTextEntry1] : Complete skin examination is negative for malignancy; Multiple new concerns were addressed and discussed.\par Therapeutic options and their risks and benefits; along with multiple diagnostic possibilities were discussed at length;\par risks and benefits of skin biopsy and/or other further study were discussed;\par \par Recent BCC;  Follow up for TBSE in 6 months \par Continue regular exams; \par LN2 to AK R brow; \par

## 2022-05-06 NOTE — HISTORY OF PRESENT ILLNESS
[de-identified] : Pt. presents for skin check;\par c/o few spots of concern; back\par Severity:  mild  \par Modifying factors:  none\par Associated symptoms:  none\par Context:  no association with activity\par \par Recent BCC near nose 2019\par

## 2022-05-06 NOTE — PHYSICAL EXAM
[Full Body Skin Exam Performed] : performed [FreeTextEntry3] : Skin examination performed of the face, neck, trunk, arms, legs; \par The patient is well, alert and oriented, pleasant and cooperative.\par Eyelids, conjunctivae, oral mucosa, digits and nails all normal.  \par No cervical adenopathy.\par \par Normal findings include:\par \par Seborrheic keratoses- back, flanks- L neck\par Angiomas\par Lentigines\par healed scar;  R paranasal near ala\par scaling erythematous papule; Right lateral eyebrow; \par \par No lesions were suspicious for malignancy. \par \par

## 2022-07-18 ENCOUNTER — RX RENEWAL (OUTPATIENT)
Age: 67
End: 2022-07-18

## 2022-08-28 ENCOUNTER — RX RENEWAL (OUTPATIENT)
Age: 67
End: 2022-08-28

## 2022-10-14 ENCOUNTER — RX RENEWAL (OUTPATIENT)
Age: 67
End: 2022-10-14

## 2023-01-06 ENCOUNTER — APPOINTMENT (OUTPATIENT)
Dept: DERMATOLOGY | Facility: CLINIC | Age: 68
End: 2023-01-06
Payer: MEDICARE

## 2023-01-06 DIAGNOSIS — L82.1 OTHER SEBORRHEIC KERATOSIS: ICD-10-CM

## 2023-01-06 PROCEDURE — 99213 OFFICE O/P EST LOW 20 MIN: CPT | Mod: 25

## 2023-01-06 PROCEDURE — 17000 DESTRUCT PREMALG LESION: CPT

## 2023-01-06 PROCEDURE — 17003 DESTRUCT PREMALG LES 2-14: CPT

## 2023-01-06 NOTE — ASSESSMENT
[FreeTextEntry1] : Complete skin examination is negative for malignancy; Multiple new concerns were addressed and discussed.\par Therapeutic options and their risks and benefits; along with multiple diagnostic possibilities were discussed at length;\par risks and benefits of skin biopsy and/or other further study were discussed;\par \par Recent BCC;  Follow up for TBSE in 6 months \par Continue regular exams; \par \par Cryo to AKs R lateral cheek, R temple\par

## 2023-01-06 NOTE — PHYSICAL EXAM
[Full Body Skin Exam Performed] : performed [FreeTextEntry3] : Skin examination performed of the face, neck, trunk, arms, legs; \par The patient is well, alert and oriented, pleasant and cooperative.\par Eyelids, conjunctivae, oral mucosa, digits and nails all normal.  \par No cervical adenopathy.\par \par Normal findings include:\par \par Seborrheic keratoses- back, flanks- L neck\par Angiomas\par Lentigines\par \par healed scar;  R paranasal near ala\par scaling erythematous papules; R cheek/ preauricular \par \par No lesions were suspicious for malignancy. \par \par

## 2023-01-06 NOTE — HISTORY OF PRESENT ILLNESS
[de-identified] : Pt. presents for skin check;\par c/o few spots of concern; face\par Severity:  mild  \par Modifying factors:  none\par Associated symptoms:  none\par Context:  no association with activity\par \par Recent BCC near nose 2019\par

## 2023-03-06 ENCOUNTER — NON-APPOINTMENT (OUTPATIENT)
Age: 68
End: 2023-03-06

## 2023-03-06 ENCOUNTER — APPOINTMENT (OUTPATIENT)
Dept: INTERNAL MEDICINE | Facility: CLINIC | Age: 68
End: 2023-03-06
Payer: MEDICARE

## 2023-03-06 VITALS — DIASTOLIC BLOOD PRESSURE: 80 MMHG | SYSTOLIC BLOOD PRESSURE: 138 MMHG

## 2023-03-06 PROCEDURE — 99213 OFFICE O/P EST LOW 20 MIN: CPT

## 2023-03-06 NOTE — PHYSICAL EXAM
[No Acute Distress] : no acute distress [Normal Oropharynx] : the oropharynx was normal [Normal TMs] : both tympanic membranes were normal [No Lymphadenopathy] : no lymphadenopathy [Clear to Auscultation] : lungs were clear to auscultation bilaterally

## 2023-03-06 NOTE — ASSESSMENT
[FreeTextEntry1] : Persistent sinusitis–he is given Omnicef 3 mg twice daily for 10 days along with a Medrol pack for 6 days.\par He will call follow-up if no improvement or any worsening.

## 2023-03-06 NOTE — REVIEW OF SYSTEMS
[Postnasal Drip] : postnasal drip [Fever] : no fever [Chest Pain] : no chest pain [Shortness Of Breath] : no shortness of breath [Cough] : no cough

## 2023-04-02 ENCOUNTER — RX RENEWAL (OUTPATIENT)
Age: 68
End: 2023-04-02

## 2023-04-24 ENCOUNTER — APPOINTMENT (OUTPATIENT)
Dept: INTERNAL MEDICINE | Facility: CLINIC | Age: 68
End: 2023-04-24
Payer: MEDICARE

## 2023-04-24 ENCOUNTER — NON-APPOINTMENT (OUTPATIENT)
Age: 68
End: 2023-04-24

## 2023-04-24 VITALS
DIASTOLIC BLOOD PRESSURE: 88 MMHG | SYSTOLIC BLOOD PRESSURE: 140 MMHG | HEIGHT: 68 IN | WEIGHT: 177 LBS | BODY MASS INDEX: 26.83 KG/M2

## 2023-04-24 DIAGNOSIS — L23.7 ALLERGIC CONTACT DERMATITIS DUE TO PLANTS, EXCEPT FOOD: ICD-10-CM

## 2023-04-24 PROCEDURE — 36415 COLL VENOUS BLD VENIPUNCTURE: CPT

## 2023-04-24 PROCEDURE — G0439: CPT

## 2023-04-24 NOTE — ASSESSMENT
[FreeTextEntry1] : CAD/hypertension–his blood pressure is well controlled and has been asymptomatic.  He does have regular follow-up with his cardiologist.  For now he will continue with Toprol XL 25 mg daily and aspirin 81 mg daily.\par \par Hyperlipidemia–follow-up fasting labs/lipid profile was sent.  For now who remain on Crestor 40 mg daily and Zetia 10 mg daily.\par \par Hypothyroidism–follow-up thyroid functions was sent.  He is presently on levothyroxine 75 mcg daily.\par \par BPH–follow-up PSA was sent.\par \par Poison ivy dermatitis–the rash has persisted.  He will start prednisone 50 mg for 2 days and taper by 10 mg every other day.\par \par Left upper arm rash–he was told this does appear somewhat different than the wrist there is irruption.  It could be consistent with early Lyme disease.  He has no other related symptoms..  Lyme titer was sent.\par \par Last colonoscopy was in 2021 and that revealed a benign polyp

## 2023-04-24 NOTE — HISTORY OF PRESENT ILLNESS
[de-identified] : 87-year-old male presents for annual wellness exam, follow-up fasting labs and medication management.\par He has a history of CAD/PTCA in 2016, hypertension, hyperlipidemia, hypothyroidism and chronic allergies.\par Has been generally well without any recent illness.\par Has been dealing with a bout of poison ivy for the past 2 weeks.  He did do a course of Medrol for 6 days that was slightly helpful.  The rash has persisted.\par

## 2023-04-24 NOTE — HEALTH RISK ASSESSMENT
[No] : In the past 12 months have you used drugs other than those required for medical reasons? No [No falls in past year] : Patient reported no falls in the past year [0] : 2) Feeling down, depressed, or hopeless: Not at all (0) [PHQ-2 Negative - No further assessment needed] : PHQ-2 Negative - No further assessment needed [Patient reported colonoscopy was normal] : Patient reported colonoscopy was normal [Never] : Never [[id number]] : 0 [ColonoscopyDate] : 05/21 [ColonoscopyComments] : Single benign polyp

## 2023-04-24 NOTE — REVIEW OF SYSTEMS
[Itching] : itching [Skin Rash] : skin rash [Fever] : no fever [Chills] : no chills [Chest Pain] : no chest pain [Shortness Of Breath] : no shortness of breath [Dyspnea on Exertion] : not dyspnea on exertion [Abdominal Pain] : no abdominal pain [Joint Pain] : no joint pain [Headache] : no headache

## 2023-04-24 NOTE — PHYSICAL EXAM
[No Acute Distress] : no acute distress [No JVD] : no jugular venous distention [No Respiratory Distress] : no respiratory distress  [Normal Rate] : normal rate  [Regular Rhythm] : with a regular rhythm [Grossly Normal Strength/Tone] : grossly normal strength/tone [No Focal Deficits] : no focal deficits [Alert and Oriented x3] : oriented to person, place, and time [de-identified] : Widespread areas of eruption consistent with poison ivy.  Posterior left upper arm area with a circular erythematous area approximately 4-5 inches in diameter.

## 2023-04-25 LAB
ALBUMIN SERPL ELPH-MCNC: 4.3 G/DL
ALP BLD-CCNC: 71 U/L
ALT SERPL-CCNC: 41 U/L
ANION GAP SERPL CALC-SCNC: 10 MMOL/L
AST SERPL-CCNC: 31 U/L
B BURGDOR AB SER-IMP: NEGATIVE
B BURGDOR IGG+IGM SER QL: 0.08 INDEX
BASOPHILS # BLD AUTO: 0.05 K/UL
BASOPHILS NFR BLD AUTO: 0.8 %
BILIRUB SERPL-MCNC: 0.6 MG/DL
BUN SERPL-MCNC: 10 MG/DL
CALCIUM SERPL-MCNC: 9.5 MG/DL
CHLORIDE SERPL-SCNC: 105 MMOL/L
CHOLEST SERPL-MCNC: 122 MG/DL
CO2 SERPL-SCNC: 21 MMOL/L
CREAT SERPL-MCNC: 0.98 MG/DL
EGFR: 85 ML/MIN/1.73M2
EOSINOPHIL # BLD AUTO: 0.32 K/UL
EOSINOPHIL NFR BLD AUTO: 5.3 %
GLUCOSE SERPL-MCNC: 99 MG/DL
HCT VFR BLD CALC: 42.6 %
HDLC SERPL-MCNC: 52 MG/DL
HGB BLD-MCNC: 13.2 G/DL
IMM GRANULOCYTES NFR BLD AUTO: 0.2 %
LDLC SERPL CALC-MCNC: 52 MG/DL
LYMPHOCYTES # BLD AUTO: 1.28 K/UL
LYMPHOCYTES NFR BLD AUTO: 21.4 %
MAN DIFF?: NORMAL
MCHC RBC-ENTMCNC: 27 PG
MCHC RBC-ENTMCNC: 31 GM/DL
MCV RBC AUTO: 87.1 FL
MONOCYTES # BLD AUTO: 0.37 K/UL
MONOCYTES NFR BLD AUTO: 6.2 %
NEUTROPHILS # BLD AUTO: 3.96 K/UL
NEUTROPHILS NFR BLD AUTO: 66.1 %
NONHDLC SERPL-MCNC: 71 MG/DL
PLATELET # BLD AUTO: 215 K/UL
POTASSIUM SERPL-SCNC: 4.3 MMOL/L
PROT SERPL-MCNC: 6.4 G/DL
PSA SERPL-MCNC: 0.79 NG/ML
RBC # BLD: 4.89 M/UL
RBC # FLD: 14 %
SODIUM SERPL-SCNC: 136 MMOL/L
T4 SERPL-MCNC: 7.5 UG/DL
TRIGL SERPL-MCNC: 96 MG/DL
TSH SERPL-ACNC: 1.17 UIU/ML
WBC # FLD AUTO: 5.99 K/UL

## 2023-06-06 ENCOUNTER — APPOINTMENT (OUTPATIENT)
Dept: INTERNAL MEDICINE | Facility: CLINIC | Age: 68
End: 2023-06-06
Payer: MEDICARE

## 2023-06-06 VITALS
HEIGHT: 68 IN | BODY MASS INDEX: 27.28 KG/M2 | SYSTOLIC BLOOD PRESSURE: 130 MMHG | DIASTOLIC BLOOD PRESSURE: 80 MMHG | WEIGHT: 180 LBS

## 2023-06-06 DIAGNOSIS — H10.9 UNSPECIFIED CONJUNCTIVITIS: ICD-10-CM

## 2023-06-06 DIAGNOSIS — J32.9 CHRONIC SINUSITIS, UNSPECIFIED: ICD-10-CM

## 2023-06-06 PROCEDURE — 99213 OFFICE O/P EST LOW 20 MIN: CPT

## 2023-06-06 RX ORDER — METHYLPREDNISOLONE 4 MG/1
4 TABLET ORAL
Qty: 1 | Refills: 0 | Status: ACTIVE | COMMUNITY
Start: 2023-03-06 | End: 1900-01-01

## 2023-06-06 NOTE — PHYSICAL EXAM
[No Acute Distress] : no acute distress [Normal Oropharynx] : the oropharynx was normal [Normal TMs] : both tympanic membranes were normal [No Lymphadenopathy] : no lymphadenopathy [Clear to Auscultation] : lungs were clear to auscultation bilaterally [de-identified] : Right conjunctiva/sclera injected

## 2023-06-06 NOTE — ASSESSMENT
[FreeTextEntry1] : Sinusitis–he will start Omnicef 300 mg twice daily for 10 days along with a Medrol pack for the next 6 days.\par \par Conjunctivitis–also given Maxitrol ophthalmic solution to use 4 times daily.

## 2023-06-06 NOTE — REVIEW OF SYSTEMS
[Fever] : no fever [Pain] : no pain [Redness] : redness [Vision Problems] : no vision problems [Itching] : itching [Postnasal Drip] : postnasal drip [Nasal Discharge] : nasal discharge [Chest Pain] : no chest pain [Cough] : no cough

## 2023-06-06 NOTE — HISTORY OF PRESENT ILLNESS
[FreeTextEntry8] : Presents with persistent sinus pain and pressure with congestion, postnasal drip over the past week or so.  Denies any fever or chills.  Mucus is becoming more discolored.  Also has right eye irritation with some drainage for the past day.

## 2023-06-10 ENCOUNTER — RX RENEWAL (OUTPATIENT)
Age: 68
End: 2023-06-10

## 2023-06-21 ENCOUNTER — RX RENEWAL (OUTPATIENT)
Age: 68
End: 2023-06-21

## 2023-06-26 RX ORDER — PREDNISONE 10 MG/1
10 TABLET ORAL
Qty: 30 | Refills: 0 | Status: ACTIVE | COMMUNITY
Start: 2023-04-24 | End: 1900-01-01

## 2023-07-06 ENCOUNTER — APPOINTMENT (OUTPATIENT)
Dept: DERMATOLOGY | Facility: CLINIC | Age: 68
End: 2023-07-06
Payer: MEDICARE

## 2023-07-06 PROCEDURE — 99213 OFFICE O/P EST LOW 20 MIN: CPT

## 2023-07-06 NOTE — PHYSICAL EXAM
[Full Body Skin Exam Performed] : performed [FreeTextEntry3] : Skin examination performed of the face, neck, trunk, arms, legs; \par The patient is well, alert and oriented, pleasant and cooperative.\par Eyelids, conjunctivae, oral mucosa, digits and nails all normal.  \par No cervical adenopathy.\par \par Normal findings include:\par \par Seborrheic keratoses- back, flanks- L neck\par Angiomas\par Lentigines\par \par healed scar;  R paranasal near ala\par AKs resolved;  R cheek/ preauricular \par \par No lesions were suspicious for malignancy. \par \par

## 2023-07-06 NOTE — HISTORY OF PRESENT ILLNESS
[de-identified] : Pt. presents for skin check;\par c/o few spots of concern; face\par Severity:  mild  \par Modifying factors:  none\par Associated symptoms:  none\par Context:  no association with activity\par \par Recent BCC near nose 2019\par

## 2023-07-06 NOTE — ASSESSMENT
[FreeTextEntry1] : Complete skin examination is negative for malignancy; Multiple new concerns were addressed and discussed.\par Therapeutic options and their risks and benefits; along with multiple diagnostic possibilities were discussed at length;\par risks and benefits of skin biopsy and/or other further study were discussed;\par \par Recent BCC;  Follow up for TBSE in 6 months \par Continue regular exams; \par \par Prior AKs did well with cryo\par

## 2023-08-23 DIAGNOSIS — Z86.16 PERSONAL HISTORY OF COVID-19: ICD-10-CM

## 2023-09-04 ENCOUNTER — RX RENEWAL (OUTPATIENT)
Age: 68
End: 2023-09-04

## 2023-10-15 ENCOUNTER — RX RENEWAL (OUTPATIENT)
Age: 68
End: 2023-10-15

## 2023-11-10 ENCOUNTER — APPOINTMENT (OUTPATIENT)
Dept: INTERNAL MEDICINE | Facility: CLINIC | Age: 68
End: 2023-11-10
Payer: MEDICARE

## 2023-11-10 VITALS
WEIGHT: 181 LBS | HEIGHT: 68 IN | BODY MASS INDEX: 27.43 KG/M2 | SYSTOLIC BLOOD PRESSURE: 124 MMHG | DIASTOLIC BLOOD PRESSURE: 76 MMHG

## 2023-11-10 DIAGNOSIS — B36.9 SUPERFICIAL MYCOSIS, UNSPECIFIED: ICD-10-CM

## 2023-11-10 DIAGNOSIS — Z23 ENCOUNTER FOR IMMUNIZATION: ICD-10-CM

## 2023-11-10 PROCEDURE — 90662 IIV NO PRSV INCREASED AG IM: CPT

## 2023-11-10 PROCEDURE — 36415 COLL VENOUS BLD VENIPUNCTURE: CPT

## 2023-11-10 PROCEDURE — 99214 OFFICE O/P EST MOD 30 MIN: CPT | Mod: 25

## 2023-11-10 PROCEDURE — G0008: CPT

## 2023-11-10 RX ORDER — ECONAZOLE NITRATE 10 MG/G
1 CREAM TOPICAL TWICE DAILY
Qty: 1 | Refills: 1 | Status: ACTIVE | COMMUNITY
Start: 2023-11-10 | End: 1900-01-01

## 2023-11-11 LAB
ALBUMIN SERPL ELPH-MCNC: 4.7 G/DL
ALP BLD-CCNC: 87 U/L
ALT SERPL-CCNC: 25 U/L
ANION GAP SERPL CALC-SCNC: 10 MMOL/L
AST SERPL-CCNC: 29 U/L
BASOPHILS # BLD AUTO: 0.04 K/UL
BASOPHILS NFR BLD AUTO: 0.9 %
BILIRUB SERPL-MCNC: 0.5 MG/DL
BUN SERPL-MCNC: 12 MG/DL
CALCIUM SERPL-MCNC: 9.3 MG/DL
CHLORIDE SERPL-SCNC: 102 MMOL/L
CHOLEST SERPL-MCNC: 106 MG/DL
CO2 SERPL-SCNC: 24 MMOL/L
CREAT SERPL-MCNC: 0.97 MG/DL
EGFR: 85 ML/MIN/1.73M2
EOSINOPHIL # BLD AUTO: 0.13 K/UL
EOSINOPHIL NFR BLD AUTO: 3.1 %
GLUCOSE SERPL-MCNC: 89 MG/DL
HCT VFR BLD CALC: 43.2 %
HDLC SERPL-MCNC: 52 MG/DL
HGB BLD-MCNC: 13.7 G/DL
IMM GRANULOCYTES NFR BLD AUTO: 0 %
LDLC SERPL CALC-MCNC: 42 MG/DL
LYMPHOCYTES # BLD AUTO: 1.23 K/UL
LYMPHOCYTES NFR BLD AUTO: 29 %
MAN DIFF?: NORMAL
MCHC RBC-ENTMCNC: 27.5 PG
MCHC RBC-ENTMCNC: 31.7 GM/DL
MCV RBC AUTO: 86.7 FL
MONOCYTES # BLD AUTO: 0.27 K/UL
MONOCYTES NFR BLD AUTO: 6.4 %
NEUTROPHILS # BLD AUTO: 2.57 K/UL
NEUTROPHILS NFR BLD AUTO: 60.6 %
NONHDLC SERPL-MCNC: 54 MG/DL
PLATELET # BLD AUTO: 263 K/UL
POTASSIUM SERPL-SCNC: 4.7 MMOL/L
PROT SERPL-MCNC: 7 G/DL
RBC # BLD: 4.98 M/UL
RBC # FLD: 12.7 %
SODIUM SERPL-SCNC: 135 MMOL/L
TRIGL SERPL-MCNC: 51 MG/DL
TSH SERPL-ACNC: 1.63 UIU/ML
WBC # FLD AUTO: 4.24 K/UL

## 2023-11-25 ENCOUNTER — RX RENEWAL (OUTPATIENT)
Age: 68
End: 2023-11-25

## 2023-12-13 ENCOUNTER — RX RENEWAL (OUTPATIENT)
Age: 68
End: 2023-12-13

## 2024-01-04 ENCOUNTER — APPOINTMENT (OUTPATIENT)
Dept: DERMATOLOGY | Facility: CLINIC | Age: 69
End: 2024-01-04
Payer: MEDICARE

## 2024-01-04 DIAGNOSIS — C44.310 BASAL CELL CARCINOMA OF SKIN OF UNSPECIFIED PARTS OF FACE: ICD-10-CM

## 2024-01-04 DIAGNOSIS — L57.0 ACTINIC KERATOSIS: ICD-10-CM

## 2024-01-04 DIAGNOSIS — R21 RASH AND OTHER NONSPECIFIC SKIN ERUPTION: ICD-10-CM

## 2024-01-04 DIAGNOSIS — L81.4 OTHER MELANIN HYPERPIGMENTATION: ICD-10-CM

## 2024-01-04 PROCEDURE — 99214 OFFICE O/P EST MOD 30 MIN: CPT

## 2024-01-04 NOTE — ASSESSMENT
[FreeTextEntry1] : Complete skin examination is negative for malignancy; Multiple new concerns were addressed and discussed. Therapeutic options and their risks and benefits; along with multiple diagnostic possibilities were discussed at length; risks and benefits of skin biopsy and/or other further study were discussed;   Recent BCC; Follow up for TBSE in 6 months  Continue regular exams;  Prior AKs did well with cryo  frictional ICD b/l axillae;  now improving with cooler weather; continue to use [employer] barrier stick before exercize if flares again, call for fluticasone ointment Rx

## 2024-01-04 NOTE — HISTORY OF PRESENT ILLNESS
[de-identified] : Pt. presents for skin check; c/o few spots of concern; face Severity:  mild   Modifying factors:  none Associated symptoms:  none Context:  no association with activity  Recent BCC near nose 2019 also:  c/o rash under arms;  very inflamed, exacerbated by activity;  used econazole from PMD, eventually resolved;

## 2024-01-04 NOTE — PHYSICAL EXAM
[Full Body Skin Exam Performed] : performed [FreeTextEntry3] : Skin examination performed of the face, neck, trunk, arms, legs;  The patient is well, alert and oriented, pleasant and cooperative. Eyelids, conjunctivae, oral mucosa, digits and nails all normal.   No cervical adenopathy.  Normal findings include:  Seborrheic keratoses- back, flanks- L neck Angiomas Lentigines  healed scar;  R paranasal near ala AKs resolved;  R cheek/ preauricular   No lesions were suspicious for malignancy.   resolving eczematous patches b/l axillae

## 2024-01-12 ENCOUNTER — RX RENEWAL (OUTPATIENT)
Age: 69
End: 2024-01-12

## 2024-01-23 DIAGNOSIS — L20.9 ATOPIC DERMATITIS, UNSPECIFIED: ICD-10-CM

## 2024-01-23 RX ORDER — FLUTICASONE PROPIONATE 0.05 MG/G
0.01 OINTMENT TOPICAL
Qty: 1 | Refills: 1 | Status: ACTIVE | COMMUNITY
Start: 2024-01-23 | End: 1900-01-01

## 2024-02-26 ENCOUNTER — RX RENEWAL (OUTPATIENT)
Age: 69
End: 2024-02-26

## 2024-05-22 ENCOUNTER — RX RENEWAL (OUTPATIENT)
Age: 69
End: 2024-05-22

## 2024-06-03 ENCOUNTER — RX RENEWAL (OUTPATIENT)
Age: 69
End: 2024-06-03

## 2024-06-14 ENCOUNTER — APPOINTMENT (OUTPATIENT)
Dept: INTERNAL MEDICINE | Facility: CLINIC | Age: 69
End: 2024-06-14
Payer: MEDICARE

## 2024-06-14 VITALS
BODY MASS INDEX: 26.52 KG/M2 | HEIGHT: 68 IN | WEIGHT: 175 LBS | SYSTOLIC BLOOD PRESSURE: 124 MMHG | DIASTOLIC BLOOD PRESSURE: 74 MMHG

## 2024-06-14 DIAGNOSIS — J30.9 ALLERGIC RHINITIS, UNSPECIFIED: ICD-10-CM

## 2024-06-14 DIAGNOSIS — I25.10 ATHEROSCLEROTIC HEART DISEASE OF NATIVE CORONARY ARTERY W/OUT ANGINA PECTORIS: ICD-10-CM

## 2024-06-14 DIAGNOSIS — I10 ESSENTIAL (PRIMARY) HYPERTENSION: ICD-10-CM

## 2024-06-14 DIAGNOSIS — E78.5 HYPERLIPIDEMIA, UNSPECIFIED: ICD-10-CM

## 2024-06-14 DIAGNOSIS — Z00.00 ENCOUNTER FOR GENERAL ADULT MEDICAL EXAMINATION W/OUT ABNORMAL FINDINGS: ICD-10-CM

## 2024-06-14 DIAGNOSIS — N40.0 BENIGN PROSTATIC HYPERPLASIA WITHOUT LOWER URINARY TRACT SYMPMS: ICD-10-CM

## 2024-06-14 DIAGNOSIS — E03.9 HYPOTHYROIDISM, UNSPECIFIED: ICD-10-CM

## 2024-06-14 PROCEDURE — G0439: CPT

## 2024-06-14 PROCEDURE — 36415 COLL VENOUS BLD VENIPUNCTURE: CPT

## 2024-06-14 RX ORDER — NIRMATRELVIR AND RITONAVIR 300-100 MG
20 X 150 MG & KIT ORAL
Qty: 30 | Refills: 0 | Status: DISCONTINUED | COMMUNITY
Start: 2023-08-23 | End: 2024-06-14

## 2024-06-14 RX ORDER — METOPROLOL SUCCINATE 25 MG/1
25 TABLET, EXTENDED RELEASE ORAL
Qty: 90 | Refills: 1 | Status: ACTIVE | COMMUNITY
Start: 2019-01-17 | End: 1900-01-01

## 2024-06-14 RX ORDER — ROSUVASTATIN CALCIUM 40 MG/1
40 TABLET, FILM COATED ORAL
Qty: 90 | Refills: 1 | Status: ACTIVE | COMMUNITY
Start: 2022-08-28 | End: 1900-01-01

## 2024-06-14 RX ORDER — EZETIMIBE 10 MG/1
10 TABLET ORAL
Qty: 90 | Refills: 1 | Status: ACTIVE | COMMUNITY
Start: 2017-08-10 | End: 1900-01-01

## 2024-06-14 RX ORDER — AZELASTINE HYDROCHLORIDE 137 UG/1
137 SPRAY, METERED NASAL
Qty: 1 | Refills: 2 | Status: ACTIVE | COMMUNITY
Start: 2023-03-06 | End: 1900-01-01

## 2024-06-14 RX ORDER — AZELASTINE HYDROCHLORIDE 205.5 UG/1
0.15 SPRAY, METERED NASAL
Qty: 3 | Refills: 3 | Status: DISCONTINUED | COMMUNITY
Start: 2017-01-11 | End: 2024-06-14

## 2024-06-14 RX ORDER — LEVOTHYROXINE SODIUM 0.07 MG/1
75 TABLET ORAL
Qty: 90 | Refills: 1 | Status: ACTIVE | COMMUNITY
Start: 2019-03-10 | End: 1900-01-01

## 2024-06-14 RX ORDER — DOXYCYCLINE HYCLATE 100 MG/1
100 CAPSULE ORAL TWICE DAILY
Qty: 60 | Refills: 3 | Status: DISCONTINUED | COMMUNITY
Start: 2020-06-30 | End: 2024-06-14

## 2024-06-14 RX ORDER — NEOMYCIN SULFATE, POLYMYXIN B SULFATE AND DEXAMETHASONE 3.5; 10000; 1 MG/ML; [USP'U]/ML; MG/ML
3.5-10000-0.1 SUSPENSION OPHTHALMIC
Qty: 1 | Refills: 0 | Status: DISCONTINUED | COMMUNITY
Start: 2022-02-10 | End: 2024-06-14

## 2024-06-14 NOTE — ASSESSMENT
[FreeTextEntry1] : CAD/hypertension-blood pressures well controlled and he remains asymptomatic. Continue Toprol-XL 25 mg daily and aspirin 81 mg daily.  Hyperlipidemia-repeat lipid profile was sent.  Cholesterol has been very well-controlled. Continue Crestor 40 mg daily and Zetia 10 mg daily.  Hypothyroidism-repeat TSH was sent.  Continue levothyroxine 75 mcg daily.  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  Allergic rhinitis-uses Astelin spray.  Last colonoscopy was in 2021 and that revealed a single benign polyp.

## 2024-06-14 NOTE — HISTORY OF PRESENT ILLNESS
[de-identified] : 68-year-old male presents for annual wellness visit, follow-up fasting labs and prescription renewals. He has a history of CAD/PTCA in 2016, hypertension, hyperlipidemia, hypothyroidism and chronic allergies. Has been generally well without any recent illness. Last colonoscopy in 2021 revealed a single benign polyp.

## 2024-06-14 NOTE — PHYSICAL EXAM
[No Acute Distress] : no acute distress [Normal Oropharynx] : the oropharynx was normal [Normal TMs] : both tympanic membranes were normal [No Respiratory Distress] : no respiratory distress  [Clear to Auscultation] : lungs were clear to auscultation bilaterally [Regular Rhythm] : with a regular rhythm [Normal S1, S2] : normal S1 and S2 [Grossly Normal Strength/Tone] : grossly normal strength/tone [No Focal Deficits] : no focal deficits [Alert and Oriented x3] : oriented to person, place, and time [de-identified] : Benign

## 2024-06-14 NOTE — HEALTH RISK ASSESSMENT
[No] : In the past 12 months have you used drugs other than those required for medical reasons? No [No falls in past year] : Patient reported no falls in the past year [0] : 2) Feeling down, depressed, or hopeless: Not at all (0) [PHQ-2 Negative - No further assessment needed] : PHQ-2 Negative - No further assessment needed [[id number]] : 0 [Never] : Never [Patient reported colonoscopy was normal] : Patient reported colonoscopy was normal [ColonoscopyDate] : 05/21 [ColonoscopyComments] : Benign polyp

## 2024-06-14 NOTE — REVIEW OF SYSTEMS
[Fever] : no fever [Chest Pain] : no chest pain [Lower Ext Edema] : no lower extremity edema [Shortness Of Breath] : no shortness of breath [Dyspnea on Exertion] : not dyspnea on exertion [Abdominal Pain] : no abdominal pain [Headache] : no headache [Dizziness] : no dizziness [Fainting] : no fainting

## 2024-06-15 LAB
ALBUMIN SERPL ELPH-MCNC: 4.6 G/DL
ALP BLD-CCNC: 77 U/L
ALT SERPL-CCNC: 23 U/L
ANION GAP SERPL CALC-SCNC: 12 MMOL/L
AST SERPL-CCNC: 29 U/L
BASOPHILS # BLD AUTO: 0.05 K/UL
BASOPHILS NFR BLD AUTO: 0.8 %
BILIRUB SERPL-MCNC: 0.6 MG/DL
BUN SERPL-MCNC: 10 MG/DL
CALCIUM SERPL-MCNC: 9.6 MG/DL
CHLORIDE SERPL-SCNC: 103 MMOL/L
CHOLEST SERPL-MCNC: 114 MG/DL
CO2 SERPL-SCNC: 22 MMOL/L
CREAT SERPL-MCNC: 0.86 MG/DL
EGFR: 94 ML/MIN/1.73M2
EOSINOPHIL # BLD AUTO: 0.17 K/UL
EOSINOPHIL NFR BLD AUTO: 2.6 %
GLUCOSE SERPL-MCNC: 102 MG/DL
HCT VFR BLD CALC: 43.6 %
HDLC SERPL-MCNC: 51 MG/DL
HGB BLD-MCNC: 13.4 G/DL
IMM GRANULOCYTES NFR BLD AUTO: 0.2 %
LDLC SERPL CALC-MCNC: 48 MG/DL
LYMPHOCYTES # BLD AUTO: 1.06 K/UL
LYMPHOCYTES NFR BLD AUTO: 16.4 %
MAN DIFF?: NORMAL
MCHC RBC-ENTMCNC: 27.7 PG
MCHC RBC-ENTMCNC: 30.7 GM/DL
MCV RBC AUTO: 90.1 FL
MONOCYTES # BLD AUTO: 0.31 K/UL
MONOCYTES NFR BLD AUTO: 4.8 %
NEUTROPHILS # BLD AUTO: 4.86 K/UL
NEUTROPHILS NFR BLD AUTO: 75.2 %
NONHDLC SERPL-MCNC: 63 MG/DL
PLATELET # BLD AUTO: 244 K/UL
POTASSIUM SERPL-SCNC: 4.4 MMOL/L
PROT SERPL-MCNC: 7 G/DL
PSA SERPL-MCNC: 0.64 NG/ML
RBC # BLD: 4.84 M/UL
RBC # FLD: 13.2 %
SODIUM SERPL-SCNC: 137 MMOL/L
TRIGL SERPL-MCNC: 75 MG/DL
TSH SERPL-ACNC: 2.04 UIU/ML
WBC # FLD AUTO: 6.46 K/UL

## 2024-06-17 RX ORDER — CEFDINIR 300 MG/1
300 CAPSULE ORAL
Qty: 20 | Refills: 0 | Status: ACTIVE | COMMUNITY
Start: 2023-03-06 | End: 1900-01-01

## 2024-07-06 ENCOUNTER — RX RENEWAL (OUTPATIENT)
Age: 69
End: 2024-07-06

## 2024-07-11 ENCOUNTER — APPOINTMENT (OUTPATIENT)
Dept: DERMATOLOGY | Facility: CLINIC | Age: 69
End: 2024-07-11
Payer: MEDICARE

## 2024-07-11 DIAGNOSIS — L82.1 OTHER SEBORRHEIC KERATOSIS: ICD-10-CM

## 2024-07-11 DIAGNOSIS — L57.0 ACTINIC KERATOSIS: ICD-10-CM

## 2024-07-11 DIAGNOSIS — L81.4 OTHER MELANIN HYPERPIGMENTATION: ICD-10-CM

## 2024-07-11 DIAGNOSIS — L20.9 ATOPIC DERMATITIS, UNSPECIFIED: ICD-10-CM

## 2024-07-11 PROCEDURE — 99214 OFFICE O/P EST MOD 30 MIN: CPT

## 2024-12-09 ENCOUNTER — APPOINTMENT (OUTPATIENT)
Dept: INTERNAL MEDICINE | Facility: CLINIC | Age: 69
End: 2024-12-09
Payer: MEDICARE

## 2024-12-09 VITALS
BODY MASS INDEX: 27.13 KG/M2 | WEIGHT: 179 LBS | SYSTOLIC BLOOD PRESSURE: 132 MMHG | HEIGHT: 68 IN | DIASTOLIC BLOOD PRESSURE: 82 MMHG

## 2024-12-09 VITALS — DIASTOLIC BLOOD PRESSURE: 82 MMHG | SYSTOLIC BLOOD PRESSURE: 132 MMHG | WEIGHT: 179 LBS | BODY MASS INDEX: 27.22 KG/M2

## 2024-12-09 DIAGNOSIS — E78.5 HYPERLIPIDEMIA, UNSPECIFIED: ICD-10-CM

## 2024-12-09 DIAGNOSIS — J30.9 ALLERGIC RHINITIS, UNSPECIFIED: ICD-10-CM

## 2024-12-09 DIAGNOSIS — I25.10 ATHEROSCLEROTIC HEART DISEASE OF NATIVE CORONARY ARTERY W/OUT ANGINA PECTORIS: ICD-10-CM

## 2024-12-09 DIAGNOSIS — E03.9 HYPOTHYROIDISM, UNSPECIFIED: ICD-10-CM

## 2024-12-09 DIAGNOSIS — I10 ESSENTIAL (PRIMARY) HYPERTENSION: ICD-10-CM

## 2024-12-09 PROCEDURE — G2211 COMPLEX E/M VISIT ADD ON: CPT

## 2024-12-09 PROCEDURE — 99214 OFFICE O/P EST MOD 30 MIN: CPT

## 2024-12-09 PROCEDURE — 36415 COLL VENOUS BLD VENIPUNCTURE: CPT

## 2024-12-10 LAB
ALBUMIN SERPL ELPH-MCNC: 4.6 G/DL
ALP BLD-CCNC: 73 U/L
ALT SERPL-CCNC: 27 U/L
ANION GAP SERPL CALC-SCNC: 13 MMOL/L
AST SERPL-CCNC: 31 U/L
BASOPHILS # BLD AUTO: 0.05 K/UL
BASOPHILS NFR BLD AUTO: 0.9 %
BILIRUB SERPL-MCNC: 0.7 MG/DL
BUN SERPL-MCNC: 11 MG/DL
CALCIUM SERPL-MCNC: 9.5 MG/DL
CHLORIDE SERPL-SCNC: 103 MMOL/L
CHOLEST SERPL-MCNC: 98 MG/DL
CO2 SERPL-SCNC: 23 MMOL/L
CREAT SERPL-MCNC: 1.03 MG/DL
EGFR: 79 ML/MIN/1.73M2
EOSINOPHIL # BLD AUTO: 0.13 K/UL
EOSINOPHIL NFR BLD AUTO: 2.4 %
GLUCOSE SERPL-MCNC: 87 MG/DL
HCT VFR BLD CALC: 41.3 %
HDLC SERPL-MCNC: 47 MG/DL
HGB BLD-MCNC: 13.3 G/DL
IMM GRANULOCYTES NFR BLD AUTO: 0.2 %
LDLC SERPL CALC-MCNC: 38 MG/DL
LYMPHOCYTES # BLD AUTO: 1.37 K/UL
LYMPHOCYTES NFR BLD AUTO: 25 %
MAN DIFF?: NORMAL
MCHC RBC-ENTMCNC: 27.9 PG
MCHC RBC-ENTMCNC: 32.2 G/DL
MCV RBC AUTO: 86.6 FL
MONOCYTES # BLD AUTO: 0.32 K/UL
MONOCYTES NFR BLD AUTO: 5.8 %
NEUTROPHILS # BLD AUTO: 3.6 K/UL
NEUTROPHILS NFR BLD AUTO: 65.7 %
NONHDLC SERPL-MCNC: 51 MG/DL
PLATELET # BLD AUTO: 245 K/UL
POTASSIUM SERPL-SCNC: 4.4 MMOL/L
PROT SERPL-MCNC: 6.8 G/DL
RBC # BLD: 4.77 M/UL
RBC # FLD: 12.3 %
SODIUM SERPL-SCNC: 139 MMOL/L
TRIGL SERPL-MCNC: 53 MG/DL
TSH SERPL-ACNC: 1.24 UIU/ML
WBC # FLD AUTO: 5.48 K/UL

## 2025-01-16 ENCOUNTER — APPOINTMENT (OUTPATIENT)
Dept: DERMATOLOGY | Facility: CLINIC | Age: 70
End: 2025-01-16
Payer: MEDICARE

## 2025-01-16 DIAGNOSIS — L81.4 OTHER MELANIN HYPERPIGMENTATION: ICD-10-CM

## 2025-01-16 DIAGNOSIS — C44.310 BASAL CELL CARCINOMA OF SKIN OF UNSPECIFIED PARTS OF FACE: ICD-10-CM

## 2025-01-16 DIAGNOSIS — L82.1 OTHER SEBORRHEIC KERATOSIS: ICD-10-CM

## 2025-01-16 DIAGNOSIS — L57.0 ACTINIC KERATOSIS: ICD-10-CM

## 2025-01-16 PROCEDURE — 99213 OFFICE O/P EST LOW 20 MIN: CPT

## 2025-04-25 ENCOUNTER — APPOINTMENT (OUTPATIENT)
Dept: DERMATOLOGY | Facility: CLINIC | Age: 70
End: 2025-04-25
Payer: MEDICARE

## 2025-04-25 ENCOUNTER — NON-APPOINTMENT (OUTPATIENT)
Age: 70
End: 2025-04-25

## 2025-04-25 VITALS — WEIGHT: 172 LBS | HEIGHT: 68 IN | BODY MASS INDEX: 26.07 KG/M2

## 2025-04-25 DIAGNOSIS — L82.1 OTHER SEBORRHEIC KERATOSIS: ICD-10-CM

## 2025-04-25 DIAGNOSIS — L81.4 OTHER MELANIN HYPERPIGMENTATION: ICD-10-CM

## 2025-04-25 PROCEDURE — 99214 OFFICE O/P EST MOD 30 MIN: CPT

## 2025-06-11 ENCOUNTER — RX RENEWAL (OUTPATIENT)
Age: 70
End: 2025-06-11

## 2025-06-16 ENCOUNTER — APPOINTMENT (OUTPATIENT)
Dept: INTERNAL MEDICINE | Facility: CLINIC | Age: 70
End: 2025-06-16
Payer: MEDICARE

## 2025-06-16 VITALS
WEIGHT: 179 LBS | SYSTOLIC BLOOD PRESSURE: 150 MMHG | BODY MASS INDEX: 26.51 KG/M2 | DIASTOLIC BLOOD PRESSURE: 82 MMHG | HEIGHT: 69 IN

## 2025-06-16 PROBLEM — Z12.5 SCREENING FOR PROSTATE CANCER: Status: ACTIVE | Noted: 2025-06-16

## 2025-06-16 PROCEDURE — 36415 COLL VENOUS BLD VENIPUNCTURE: CPT

## 2025-06-16 PROCEDURE — G0439: CPT

## 2025-06-17 LAB
ALBUMIN SERPL ELPH-MCNC: 4.5 G/DL
ALP BLD-CCNC: 60 U/L
ALT SERPL-CCNC: 47 U/L
ANION GAP SERPL CALC-SCNC: 14 MMOL/L
AST SERPL-CCNC: 45 U/L
BASOPHILS # BLD AUTO: 0.04 K/UL
BASOPHILS NFR BLD AUTO: 0.7 %
BILIRUB SERPL-MCNC: 0.4 MG/DL
BUN SERPL-MCNC: 10 MG/DL
CALCIUM SERPL-MCNC: 9 MG/DL
CHLORIDE SERPL-SCNC: 105 MMOL/L
CHOLEST SERPL-MCNC: 123 MG/DL
CO2 SERPL-SCNC: 21 MMOL/L
CREAT SERPL-MCNC: 0.93 MG/DL
EGFRCR SERPLBLD CKD-EPI 2021: 89 ML/MIN/1.73M2
EOSINOPHIL # BLD AUTO: 0.24 K/UL
EOSINOPHIL NFR BLD AUTO: 4 %
GLUCOSE SERPL-MCNC: 102 MG/DL
HCT VFR BLD CALC: 40.9 %
HDLC SERPL-MCNC: 50 MG/DL
HGB BLD-MCNC: 12.9 G/DL
IMM GRANULOCYTES NFR BLD AUTO: 0.2 %
LDLC SERPL-MCNC: 59 MG/DL
LYMPHOCYTES # BLD AUTO: 1.15 K/UL
LYMPHOCYTES NFR BLD AUTO: 19 %
MAN DIFF?: NORMAL
MCHC RBC-ENTMCNC: 28.2 PG
MCHC RBC-ENTMCNC: 31.5 G/DL
MCV RBC AUTO: 89.5 FL
MONOCYTES # BLD AUTO: 0.4 K/UL
MONOCYTES NFR BLD AUTO: 6.6 %
NEUTROPHILS # BLD AUTO: 4.21 K/UL
NEUTROPHILS NFR BLD AUTO: 69.5 %
NONHDLC SERPL-MCNC: 73 MG/DL
PLATELET # BLD AUTO: 212 K/UL
POTASSIUM SERPL-SCNC: 4.2 MMOL/L
PROT SERPL-MCNC: 6.9 G/DL
PSA SERPL-MCNC: 0.85 NG/ML
RBC # BLD: 4.57 M/UL
RBC # FLD: 13.2 %
SODIUM SERPL-SCNC: 140 MMOL/L
TRIGL SERPL-MCNC: 63 MG/DL
TSH SERPL-ACNC: 3.21 UIU/ML
WBC # FLD AUTO: 6.05 K/UL

## 2025-07-09 ENCOUNTER — APPOINTMENT (OUTPATIENT)
Dept: DERMATOLOGY | Facility: CLINIC | Age: 70
End: 2025-07-09
Payer: MEDICARE

## 2025-07-09 PROCEDURE — 99214 OFFICE O/P EST MOD 30 MIN: CPT
